# Patient Record
Sex: MALE | Race: WHITE
[De-identification: names, ages, dates, MRNs, and addresses within clinical notes are randomized per-mention and may not be internally consistent; named-entity substitution may affect disease eponyms.]

---

## 2021-11-19 ENCOUNTER — HOSPITAL ENCOUNTER (EMERGENCY)
Dept: HOSPITAL 12 - ER | Age: 56
LOS: 1 days | Discharge: HOME | End: 2021-11-20
Payer: COMMERCIAL

## 2021-11-19 VITALS — HEIGHT: 70 IN | BODY MASS INDEX: 24.34 KG/M2 | WEIGHT: 170 LBS

## 2021-11-19 DIAGNOSIS — Z59.02: ICD-10-CM

## 2021-11-19 DIAGNOSIS — R00.0: ICD-10-CM

## 2021-11-19 DIAGNOSIS — Z20.822: ICD-10-CM

## 2021-11-19 DIAGNOSIS — R79.89: ICD-10-CM
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
## 2021-11-19 DIAGNOSIS — G31.2: ICD-10-CM

## 2021-11-19 DIAGNOSIS — F10.229: Primary | ICD-10-CM

## 2021-11-19 LAB
ALP SERPL-CCNC: 79 U/L (ref 50–136)
ALT SERPL W/O P-5'-P-CCNC: 38 U/L (ref 16–63)
AST SERPL-CCNC: 53 U/L (ref 15–37)
BILIRUB DIRECT SERPL-MCNC: 0.1 MG/DL (ref 0–0.2)
BILIRUB SERPL-MCNC: 0.2 MG/DL (ref 0.2–1)
BUN SERPL-MCNC: 10 MG/DL (ref 7–18)
CHLORIDE SERPL-SCNC: 108 MMOL/L (ref 98–107)
CO2 SERPL-SCNC: 24 MMOL/L (ref 21–32)
CREAT SERPL-MCNC: 0.8 MG/DL (ref 0.6–1.3)
ETHANOL SERPL-MCNC: 437 MG/DL (ref 0–0)
GLUCOSE SERPL-MCNC: 82 MG/DL (ref 74–106)
HCT VFR BLD AUTO: 45.4 % (ref 36.7–47.1)
MCH RBC QN AUTO: 33 UUG (ref 23.8–33.4)
MCV RBC AUTO: 97.9 FL (ref 73–96.2)
PLATELET # BLD AUTO: 168 K/UL (ref 152–348)
POTASSIUM SERPL-SCNC: 3.6 MMOL/L (ref 3.5–5.1)
WS STN SPEC: 6.6 G/DL (ref 6.4–8.2)

## 2021-11-19 PROCEDURE — 99291 CRITICAL CARE FIRST HOUR: CPT

## 2021-11-19 PROCEDURE — 85025 COMPLETE CBC W/AUTO DIFF WBC: CPT

## 2021-11-19 PROCEDURE — 96372 THER/PROPH/DIAG INJ SC/IM: CPT

## 2021-11-19 PROCEDURE — 71045 X-RAY EXAM CHEST 1 VIEW: CPT

## 2021-11-19 PROCEDURE — 87426 SARSCOV CORONAVIRUS AG IA: CPT

## 2021-11-19 PROCEDURE — A4663 DIALYSIS BLOOD PRESSURE CUFF: HCPCS

## 2021-11-19 PROCEDURE — 36415 COLL VENOUS BLD VENIPUNCTURE: CPT

## 2021-11-19 PROCEDURE — 80320 DRUG SCREEN QUANTALCOHOLS: CPT

## 2021-11-19 PROCEDURE — 80048 BASIC METABOLIC PNL TOTAL CA: CPT

## 2021-11-19 PROCEDURE — G0480 DRUG TEST DEF 1-7 CLASSES: HCPCS

## 2021-11-19 PROCEDURE — 80076 HEPATIC FUNCTION PANEL: CPT

## 2021-11-19 PROCEDURE — 93005 ELECTROCARDIOGRAM TRACING: CPT

## 2021-11-19 RX ADMIN — OLANZAPINE ONE MG: 10 INJECTION, POWDER, FOR SOLUTION INTRAMUSCULAR at 14:44

## 2021-11-19 SDOH — ECONOMIC STABILITY - HOUSING INSECURITY: UNSHELTERED HOMELESSNESS: Z59.02

## 2021-11-19 NOTE — NUR
REceived pt 56 yrs male came by amblance aggitated and shoution screaming swang 
by his hand in every [address]reek[address] DR SEXTON AT BED side pt uncoohernt not fallow any 
command uncooprative  [address]  securety at bed side  4  point 
behaviore restraine order byDR. SEXTON  ABand [address] [place]

## 2021-11-20 VITALS — DIASTOLIC BLOOD PRESSURE: 68 MMHG | SYSTOLIC BLOOD PRESSURE: 107 MMHG

## 2021-11-20 NOTE — NUR
Patient given written and verbal discharge instructions.  Patient verbalizes 
understanding of instructions.  Patient is ambulatory with steady gait.  
Refuses offer of shelter placement.  Patient given list of available shelters 
in surrounding area. Pt provided with shirt, food, water, per patient request, 
refused all other services at this time, no acute signs of distress, VSS, all 
belongings taken, IV site discontinued.